# Patient Record
Sex: FEMALE | Race: WHITE
[De-identification: names, ages, dates, MRNs, and addresses within clinical notes are randomized per-mention and may not be internally consistent; named-entity substitution may affect disease eponyms.]

---

## 2017-09-02 ENCOUNTER — HOSPITAL ENCOUNTER (EMERGENCY)
Dept: HOSPITAL 25 - UCEAST | Age: 82
Discharge: LEFT BEFORE DISCHARGE | End: 2017-09-02
Payer: MEDICARE
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
## 2017-09-02 DIAGNOSIS — S69.90XA: Primary | ICD-10-CM

## 2017-09-02 DIAGNOSIS — Z53.21: ICD-10-CM
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
## 2018-08-24 ENCOUNTER — HOSPITAL ENCOUNTER (EMERGENCY)
Dept: HOSPITAL 25 - UCEAST | Age: 83
Discharge: HOME | End: 2018-08-24
Payer: MEDICARE

## 2018-08-24 VITALS — DIASTOLIC BLOOD PRESSURE: 69 MMHG | SYSTOLIC BLOOD PRESSURE: 151 MMHG
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
## 2018-08-24 DIAGNOSIS — Z88.5: ICD-10-CM

## 2018-08-24 DIAGNOSIS — Z88.0: ICD-10-CM

## 2018-08-24 DIAGNOSIS — Z88.8: ICD-10-CM

## 2018-08-24 DIAGNOSIS — Z88.6: ICD-10-CM

## 2018-08-24 DIAGNOSIS — R07.81: ICD-10-CM

## 2018-08-24 DIAGNOSIS — S40.011A: Primary | ICD-10-CM
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
## 2018-08-24 DIAGNOSIS — Z88.2: ICD-10-CM

## 2018-08-24 DIAGNOSIS — Z88.7: ICD-10-CM

## 2018-08-24 PROCEDURE — 99212 OFFICE O/P EST SF 10 MIN: CPT

## 2018-08-24 PROCEDURE — G0463 HOSPITAL OUTPT CLINIC VISIT: HCPCS

## 2018-08-24 NOTE — UC
Minor Trauma HPI





- HPI Summary


HPI Summary: 


This patient is a 86 year old F presenting to [facility] with a chief complaint of 

trauma from tripping and falling since yesterday. The patient rates the pain 6/

10 in severity. Pt states when fell landed on right side and struck her right 

cheek. No LOC. No blood HEENT. No cp, sob, abd pain. No n/v/d. No paresthesia, 

weakness. pt with pain right shoulder and right ribs. No open wounds, no 

bleeding. No hip or leg pain. Pt did not take anything for pain. Pt without 

other complaints. Pt is RHD. . She lives by herself, got up herself after 

tripping, and drove herself here today.





Pt's medications reviewed this visit


pt take 81mg ASA





- History of Current Complaint


Chief Complaint: UCTrauma


Stated Complaint: FELL SHOULDER/RIB PAIN


Time Seen by Provider: 18 11:50


Hx Obtained From: Patient


Severity Initially: Moderate


Severity Currently: Moderate


Pain Intensity: 6


Pain Scale Used: 0-10 Numeric


Associated Signs And Symptoms: Positive: Other: - Slight headache in the back 

of her head, R rib pain, and R shoulder pain. Denies neck pain, back pain, LOC, 

blood out of ears, blood out of mouth, blood out of eyes, SOB, and new/

different abdominal pain..  Negative: Loss Of Consciousness





- Allergies/Home Medications


Allergies/Adverse Reactions: 


 Allergies











Allergy/AdvReac Type Severity Reaction Status Date / Time


 


codeine Allergy  Unknown Verified 18 12:32





   Reaction  





   Details  


 


gabapentin Allergy  Unknown Verified 18 12:32





   Reaction  





   Details  


 


hydrocodone Allergy  Unknown Verified 18 12:32





   Reaction  





   Details  


 


Penicillins Allergy  Rash Verified 18 12:31


 


Sulfa (Sulfonamide Allergy  Unknown Verified 18 12:31





Antibiotics)   Reaction  





   Details  


 


tramadol Allergy  Unknown Verified 18 12:31





   Reaction  





   Details  


 


shingles med Allergy  Diarrhea Uncoded 18 12:31


 


Shingles Medication Allergy  Diarrhea Uncoded 18 11:15














PMH/Surg Hx/FS Hx/Imm Hx


Previously Healthy: Yes - shingles





- Surgical History


Surgical History: Yes


Surgery Procedure, Year, and Place: Cardiac stents placed .  Breast 

reduction .





- Family History


Known Family History: Positive: Cardiac Disease - Mother and father both  

from [place]





- Social History


Occupation: Retired


Lives: Alone


Alcohol Use: None


Substance Use Type: None


Smoking Status (MU): Never Smoked Tobacco





Review of Systems


Skin: Bruising


ENT: Other - Denies neck pain, blood out of ears, and blood out of mouth.


Gastrointestinal: Abdominal Pain - Denies new/different abd pain


Musculoskeletal: Other: - R rib pain and R shoulder pain. Denies back pain.


Neurological: Headache - Slight HA in back of her head, Other - Denies LOC.


All Other Systems Reviewed And Are Negative: Yes





Physical Exam





- Summary


Physical Exam Summary: 





Vital Signs Reviewed: Yes


A+Ox3, no distress


Eyes: Conjunctiva Clear, EMMANUEL. EOM intact and full  Pt with mild discomfort 

right zygomatic arch.mild edema, no ecchymosis no crepitus,no abraison


ENT: Hearing grossly normal  TM x 2 clear, mmoist, uvula midline, no exudate, 

no erythema  no hemptymp, no septal hematoma


Neck: Positive: Supple


Respiratory: Positive: No respiratory distress, No accessory muscle use + CTA 

throughout  no w/r  mild ttp left anterior chest wall. no ecchymosis, no edema, 

no abraison, no crepitus


Cardiovascular: RRR  nl s1, s2  no m/r  CBT <2  sec


abd soft + BS nt/nd  no guarding, no distension n oecchymosis


Musculoskeletal Exam: TSAI x 4 without difficulty Strength Intact, ROM Intact + 

ecchymosis left lateral prox shoulder  Full AROM shoulder with mild discomfort


Neurological: Positive: Alert,  + sensation throughout


Psychological: Positive: Normal Response To Family


Skin: Positive: no rash, no ecchymosis


Triage Information Reviewed: Yes


Vital Signs: 


 Initial Vital Signs











Temp  97.6 F   18 11:18


 


Pulse  64   18 11:18


 


Resp  16   18 11:18


 


BP  151/69   18 11:18


 


Pulse Ox  99   18 11:18











Vital Signs Reviewed: Yes





Diagnostics





- Radiology


  ** Shoulder X-Ray


Radiology Interpretation Completed By: Radiologist - 13:18. MINOR AGE-RELATED 

OSTEOARTHRITIS. NO ACUTE FINDINGS.  Physician has reviewed this imaging 

report.





  ** Ribs w/ Chest X-Ray


Radiology Interpretation Completed By: Radiologist - 13:17. NO DISPLACED RIB 

FRACTURE OR PNEUMOTHORAX.  Physician has reviewed this imaging report.





Re-Evaluation





- Re-Evaluation


  ** First Eval


Comment: reviewed images with pt.  recommended apap, ice, rest , stretch.  pcp f

/u.  pt continues to decline analgesia here





Minor Trauma Course/Dx





- Course


Course Of Treatment: Pt with mechanical fall yesterday. no anticoagulants.  pt 

with mild discomfort right zygomatic arch.  mild discomfort anterior right 

chest wall and right shoulder.  pt with ecchymosis right lateral shoulder. no 

crepitus.  pt declined analgesia.  ice.  imaging





- Differential Dx/Diagnosis


Provider Diagnoses: fall.  contusion





Discharge





- Sign-Out/Discharge


Documenting (check all that apply): Patient Departure


All imaging exams completed and their final reports reviewed: Yes





- Discharge Plan


Condition: Stable


Disposition: HOME


Patient Education Materials:  Contusion in Adults (ED)


Referrals: 


Davis Curiel MD [Primary Care Provider] - 


Additional Instructions: 


- Okay to apply ice (Wrapped in a towel)  20 minutes at time. do not put 

directly on your skin


- okay to take Tylenol every 6-8 hours as needed for pain


- take deep, slow breaths several times and hour. Okay to hold a pillow or 

blanket against your ribs to provide support while taking deep breaths


- contact your doctor to schedule a follow-up appointment. Contact your doctor 

or return with questions or concerns








- Billing Disposition and Condition


Condition: STABLE


Disposition: Home





- Attestation Statements


Document Initiated by Rubiibe: Yes


Documenting Scribe: Davin Crain


Provider For Whom Scribe is Documenting (Include Credential): Ashely Lemus MD


Scribe Attestation: 


Davin AYON, scribed for Ashely Lemus MD on 18 at 0757. 


Scribe Documentation Reviewed: Yes


Provider Attestation: 


The documentation as recorded by the Davin ravi accurately reflects the 

service I personally performed and the decisions made by me, Ashely Lemus MD

## 2018-08-24 NOTE — RAD
HISTORY: fall, pain



COMPARISONS: None



VIEWS: 7 ,  Frontal view of the chest with frontal and oblique views of the right

hemithorax.



FINDINGS: There is no displaced rib fracture or pneumothorax. The visualized lungs are

clear. There is a scoliotic curvature of the spine. 



IMPRESSION: 

NO DISPLACED RIB FRACTURE OR PNEUMOTHORAX.

## 2018-08-24 NOTE — RAD
INDICATION: Right shoulder pain



COMPARISON: None



TECHNIQUE: AP, lateral, and oblique views were obtained.



FINDINGS: There is no acute bony change. There is mild AC joint osteoarthritis. There is

minimal spurring about the inferior glenoid. Soft tissues are intact.



IMPRESSION: MINOR AGE-RELATED OSTEOARTHRITIS. NO ACUTE FINDINGS.